# Patient Record
Sex: FEMALE | Race: WHITE | NOT HISPANIC OR LATINO | URBAN - METROPOLITAN AREA
[De-identification: names, ages, dates, MRNs, and addresses within clinical notes are randomized per-mention and may not be internally consistent; named-entity substitution may affect disease eponyms.]

---

## 2017-06-15 ENCOUNTER — OUTPATIENT (OUTPATIENT)
Dept: OUTPATIENT SERVICES | Facility: HOSPITAL | Age: 72
LOS: 1 days | Discharge: HOME | End: 2017-06-15
Payer: MEDICARE

## 2017-06-15 DIAGNOSIS — Z98.891 HISTORY OF UTERINE SCAR FROM PREVIOUS SURGERY: Chronic | ICD-10-CM

## 2017-06-15 DIAGNOSIS — Z98.890 OTHER SPECIFIED POSTPROCEDURAL STATES: Chronic | ICD-10-CM

## 2017-06-15 DIAGNOSIS — Z90.49 ACQUIRED ABSENCE OF OTHER SPECIFIED PARTS OF DIGESTIVE TRACT: Chronic | ICD-10-CM

## 2017-06-15 LAB
BUN SERPL-MCNC: 29 MG/DL — HIGH (ref 7–23)
CALCIUM SERPL-MCNC: 10.1 MG/DL — SIGNIFICANT CHANGE UP (ref 8.4–10.5)
CHLORIDE SERPL-SCNC: 104 MMOL/L — SIGNIFICANT CHANGE UP (ref 98–107)
CO2 SERPL-SCNC: 24 MMOL/L — SIGNIFICANT CHANGE UP (ref 22–31)
CREAT SERPL-MCNC: 0.82 MG/DL — SIGNIFICANT CHANGE UP (ref 0.5–1.3)
GLUCOSE SERPL-MCNC: 104 MG/DL — HIGH (ref 70–99)
HBA1C BLD-MCNC: 5.9 % — HIGH (ref 4–5.6)
HCT VFR BLD CALC: 39.5 % — SIGNIFICANT CHANGE UP (ref 34.5–45)
HGB BLD-MCNC: 12.6 G/DL — SIGNIFICANT CHANGE UP (ref 11.5–15.5)
MCHC RBC-ENTMCNC: 26.5 PG — LOW (ref 27–34)
MCHC RBC-ENTMCNC: 31.9 % — LOW (ref 32–36)
MCV RBC AUTO: 83.2 FL — SIGNIFICANT CHANGE UP (ref 80–100)
PLATELET # BLD AUTO: 245 K/UL — SIGNIFICANT CHANGE UP (ref 150–400)
PMV BLD: 10.3 FL — SIGNIFICANT CHANGE UP (ref 7–13)
POTASSIUM SERPL-MCNC: 4.5 MMOL/L — SIGNIFICANT CHANGE UP (ref 3.5–5.3)
POTASSIUM SERPL-SCNC: 4.5 MMOL/L — SIGNIFICANT CHANGE UP (ref 3.5–5.3)
RBC # BLD: 4.75 M/UL — SIGNIFICANT CHANGE UP (ref 3.8–5.2)
RBC # FLD: 14.5 % — SIGNIFICANT CHANGE UP (ref 10.3–14.5)
SODIUM SERPL-SCNC: 143 MMOL/L — SIGNIFICANT CHANGE UP (ref 135–145)
WBC # BLD: 8.9 K/UL — SIGNIFICANT CHANGE UP (ref 3.8–10.5)
WBC # FLD AUTO: 8.9 K/UL — SIGNIFICANT CHANGE UP (ref 3.8–10.5)

## 2017-06-15 PROCEDURE — 93010 ELECTROCARDIOGRAM REPORT: CPT

## 2017-06-15 RX ORDER — ASPIRIN/CALCIUM CARB/MAGNESIUM 324 MG
81 TABLET ORAL DAILY
Qty: 0 | Refills: 0 | Status: DISCONTINUED | OUTPATIENT
Start: 2017-06-16 | End: 2017-06-30

## 2017-06-15 RX ORDER — METOPROLOL TARTRATE 50 MG
1 TABLET ORAL
Qty: 30 | Refills: 0 | OUTPATIENT
Start: 2017-06-15 | End: 2017-07-15

## 2017-06-15 RX ORDER — SODIUM CHLORIDE 9 MG/ML
3 INJECTION INTRAMUSCULAR; INTRAVENOUS; SUBCUTANEOUS EVERY 8 HOURS
Qty: 0 | Refills: 0 | Status: DISCONTINUED | OUTPATIENT
Start: 2017-06-15 | End: 2017-06-30

## 2017-06-15 RX ORDER — SODIUM CHLORIDE 9 MG/ML
500 INJECTION INTRAMUSCULAR; INTRAVENOUS; SUBCUTANEOUS
Qty: 0 | Refills: 0 | Status: DISCONTINUED | OUTPATIENT
Start: 2017-06-15 | End: 2017-06-30

## 2017-06-15 RX ORDER — ACETAMINOPHEN 500 MG
650 TABLET ORAL EVERY 6 HOURS
Qty: 0 | Refills: 0 | Status: DISCONTINUED | OUTPATIENT
Start: 2017-06-15 | End: 2017-06-30

## 2017-06-15 RX ORDER — ATORVASTATIN CALCIUM 80 MG/1
1 TABLET, FILM COATED ORAL
Qty: 30 | Refills: 0 | OUTPATIENT
Start: 2017-06-15 | End: 2017-07-15

## 2017-06-15 RX ORDER — METOPROLOL TARTRATE 50 MG
25 TABLET ORAL ONCE
Qty: 0 | Refills: 0 | Status: COMPLETED | OUTPATIENT
Start: 2017-06-15 | End: 2017-06-15

## 2017-06-15 RX ORDER — CLOPIDOGREL BISULFATE 75 MG/1
75 TABLET, FILM COATED ORAL DAILY
Qty: 0 | Refills: 0 | Status: DISCONTINUED | OUTPATIENT
Start: 2017-06-16 | End: 2017-06-30

## 2017-06-15 RX ORDER — CLOPIDOGREL BISULFATE 75 MG/1
1 TABLET, FILM COATED ORAL
Qty: 30 | Refills: 0 | OUTPATIENT
Start: 2017-06-15 | End: 2017-07-15

## 2017-06-15 RX ORDER — LABETALOL HCL 100 MG
20 TABLET ORAL ONCE
Qty: 0 | Refills: 0 | Status: COMPLETED | OUTPATIENT
Start: 2017-06-15 | End: 2017-06-15

## 2017-06-15 RX ADMIN — Medication 20 MILLIGRAM(S): at 10:33

## 2017-06-15 RX ADMIN — Medication 650 MILLIGRAM(S): at 10:28

## 2017-06-15 RX ADMIN — SODIUM CHLORIDE 75 MILLILITER(S): 9 INJECTION INTRAMUSCULAR; INTRAVENOUS; SUBCUTANEOUS at 10:26

## 2017-06-15 RX ADMIN — Medication 25 MILLIGRAM(S): at 12:53

## 2017-06-15 RX ADMIN — Medication 650 MILLIGRAM(S): at 11:00

## 2017-06-15 NOTE — H&P CARDIOLOGY - PMH
CVA (cerebral vascular accident)  August 2015 - speech  Epilepsy  speech disturbance for many years  Hemorrhagic stroke  August 2015 - speech deficit worsened and visual field disturbances, gait disturbances  HTN (hypertension)    Hyperlipidemia    Urinary incontinence

## 2017-06-15 NOTE — H&P CARDIOLOGY - HISTORY OF PRESENT ILLNESS
72 year old female with HTN, hyperlipidemia (self d/c'd from side effect), hemorrhagic CVA (2015), epilepsy (last seizure 35years ago) who presented to his Cardiologist complaining of chest pain walking less than block, relieved with rest. This is more exertion than her typical ADL's of housework. Since, she has noted intermittent chest pain with her house. Complains of fatigue at baseline.  Denies dizziness, syncope, edema, orthopnea, PND.   In light of patients cardiac risk factors, symptoms and abnormal noninvasive test findings there is high suspicion for CAD. Patient is now referred to [facility] for a cardiac catheterization with possible PTCA/stent. 72 year old female with HTN, hyperlipidemia (self d/c'd from side effect), hemorrhagic CVA (2015), epilepsy (last seizure 35years ago) who presented to his Cardiologist complaining of chest pain walking less than block, relieved with rest. This is more exertion than her typical ADL's of housework. Since, she has noted intermittent chest pain with her house. Complains of fatigue at baseline.  Denies dizziness, syncope, edema, orthopnea, PND. Denies recent stress test.   In light of patients cardiac risk factors, symptoms and abnormal noninvasive test findings there is high suspicion for CAD. Patient is now referred to [facility] for a cardiac catheterization with possible PTCA/stent.

## 2017-06-15 NOTE — CHART NOTE - NSCHARTNOTEFT_GEN_A_CORE
The patient was noted to have elevated HgbA1c. The patient was made aware. Prediabetes pamphlet is given. The patient was recommended to f/u with PMD for further treatment.     also medications reviewed in detail with patient and  and family, med list reviewed with outpatient [facility] to obtain home medicine list as patient did not have list or know her meds. It is unclear if patient is taking Metoprolol as no record at [facility] and patient admits to getting it 2years ago via [facility] which wouldn't make sense if taking now.   Patient advised to accept assistance from her daughter and  in organizing her home medications.   For now needs better BP control, advised to stop this "metoprolol" that she thinks shes taking at home and bring all her bottles with her to Dr. Francis next week. Advised to continue Benicar 20mg po daily and start Toprol XL 25mg po daily as patients BP responded well to labetalol IVP. Will also start a different statin, Lipitor 20mg po bedtime given CAD findings, admits to not tolerating crestor in the past due to "I didn't feel well".   will discuss with Dr BRIGETTE Stern   Patient advised to follow up with Dr Francis next week.

## 2017-06-18 ENCOUNTER — EMERGENCY (EMERGENCY)
Facility: HOSPITAL | Age: 72
LOS: 1 days | Discharge: HOME | End: 2017-06-18
Attending: EMERGENCY MEDICINE | Admitting: EMERGENCY MEDICINE
Payer: MEDICARE

## 2017-06-18 VITALS
RESPIRATION RATE: 18 BRPM | TEMPERATURE: 98 F | OXYGEN SATURATION: 97 % | DIASTOLIC BLOOD PRESSURE: 115 MMHG | HEART RATE: 72 BPM | SYSTOLIC BLOOD PRESSURE: 228 MMHG

## 2017-06-18 VITALS
OXYGEN SATURATION: 98 % | RESPIRATION RATE: 16 BRPM | SYSTOLIC BLOOD PRESSURE: 206 MMHG | DIASTOLIC BLOOD PRESSURE: 69 MMHG | HEART RATE: 60 BPM

## 2017-06-18 DIAGNOSIS — Z98.891 HISTORY OF UTERINE SCAR FROM PREVIOUS SURGERY: Chronic | ICD-10-CM

## 2017-06-18 DIAGNOSIS — Z98.890 OTHER SPECIFIED POSTPROCEDURAL STATES: Chronic | ICD-10-CM

## 2017-06-18 DIAGNOSIS — Z90.49 ACQUIRED ABSENCE OF OTHER SPECIFIED PARTS OF DIGESTIVE TRACT: Chronic | ICD-10-CM

## 2017-06-18 PROCEDURE — 99050 MEDICAL SERVICES AFTER HRS: CPT

## 2017-06-18 PROCEDURE — 99284 EMERGENCY DEPT VISIT MOD MDM: CPT | Mod: 25

## 2017-06-18 PROCEDURE — 93010 ELECTROCARDIOGRAM REPORT: CPT

## 2017-06-18 RX ORDER — ACETAMINOPHEN 500 MG
650 TABLET ORAL ONCE
Qty: 0 | Refills: 0 | Status: COMPLETED | OUTPATIENT
Start: 2017-06-18 | End: 2017-06-18

## 2017-06-18 RX ADMIN — Medication 650 MILLIGRAM(S): at 03:13

## 2017-06-18 RX ADMIN — Medication 0.1 MILLIGRAM(S): at 03:13

## 2017-06-18 NOTE — ED ADULT TRIAGE NOTE - CHIEF COMPLAINT QUOTE
Pt. c/o high blood pressure (224/110) at home x 2 days; had angiogram with one stent placed on 6/15/17. Per daughter patient's pressure have remained elevated despite taking BP medications as prescribed. Pt. admits to mild headache, denies dizziness, lightheadedness, SOB, chest pain, weakness or neuro deficits. Hx of CVA, unsteady gait at baseline per daughter. Pt. c/o high blood pressure (224/110) at home x 2 days; had angiogram with one stent placed on 6/15/17. Per daughter patient's pressures have remained elevated since yesterday despite taking BP medications as prescribed. Pt. admits to mild headache, denies dizziness, lightheadedness, SOB, chest pain, weakness or neuro deficits. Hx of CVA, unsteady gait at baseline per daughter.

## 2017-06-18 NOTE — ED ADULT NURSE NOTE - CHIEF COMPLAINT QUOTE
Pt. c/o high blood pressure (224/110) at home x 2 days; had angiogram with one stent placed on 6/15/17. Per daughter patient's pressures have remained elevated since yesterday despite taking BP medications as prescribed. Pt. admits to mild headache, denies dizziness, lightheadedness, SOB, chest pain, weakness or neuro deficits. Hx of CVA, unsteady gait at baseline per daughter.

## 2017-06-18 NOTE — ED PROVIDER NOTE - CARE PLAN
Principal Discharge DX:	HTN (hypertension) Principal Discharge DX:	HTN (hypertension)  Instructions for follow-up, activity and diet:	1) Please follow-up with your primary care doctor in the next 5-7 days.  Please call tomorrow for an appointment.  If you cannot follow-up with your primary care doctor please return to the ED for any urgent issues.  2) You were given a copy of the tests performed today.  Please bring the results with you and review them with your primary care doctor.  3) If you have any worsening of symptoms or any other concerns please return to the ED immediately.  4) Please continue taking your home medications as directed.  5) Start taking Clonidine 0.1mg three times a day. Principal Discharge DX:	HTN (hypertension)  Instructions for follow-up, activity and diet:	1) Please follow-up with your primary care doctor in the next 5-7 days.  Please call tomorrow for an appointment.  If you cannot follow-up with your primary care doctor please return to the ED for any urgent issues.  2) You were given a copy of the tests performed today.  Please bring the results with you and review them with your primary care doctor.  3) If you have any worsening of symptoms or any other concerns please return to the ED immediately.  4) Please continue taking your home medications as directed.  5) Start taking Clonidine 0.1mg two times a day.

## 2017-06-18 NOTE — ED PROVIDER NOTE - MEDICAL DECISION MAKING DETAILS
72yoF hx of htn, CAD recent stent placed, pw uncontrolled htn after discontinuing clonidine TID. bp 240s in ED. will give tylenol for headache and restart Clonidine.

## 2017-06-18 NOTE — ED PROVIDER NOTE - PROGRESS NOTE DETAILS
PT seen and reassessed.  Patient symptomatically improved.   AAOX3, NAD, Vital signs improved. BP decreased.  Patient able to ambulate w/o difficulty, is tolerating PO intake. Pt will call Dr. John and make appointment Monday morning. Bennie: Left message w/ Dr. Stern answering service at 0313 w/o call back. Pt now asymptomatic. Ambulating like at baseline. BP improving. Will dc w/ clonidine bid (pt pharmacy not open sunday so given written script). Close PMD f/u and strict return instructions.

## 2017-06-18 NOTE — ED ADULT NURSE NOTE - OBJECTIVE STATEMENT
Pt 72y presents to ED with high blood pressure. As per daughter pt blood pressure has been high since yesterday (currently 246/91). As per daughter pt had stent placed last thursday and switched medications, since then pressure has been elevated despite taking blood pressure meds. Pt has hx of HTN, HLD, CVA. Pt currently denies N/V/D, chest pain, SOB, HA, dizziness, lightheadedness. Pt appears comfortable at this time, with no complaint besides high blood pressure. Will continue to monitor.

## 2017-06-18 NOTE — ED ADULT NURSE NOTE - CHPI ED SYMPTOMS NEG
no fever/no tingling/no vomiting/no numbness/no nausea/no weakness/no chills/no dizziness/no decreased eating/drinking/no pain

## 2017-06-18 NOTE — ED PROVIDER NOTE - PLAN OF CARE
1) Please follow-up with your primary care doctor in the next 5-7 days.  Please call tomorrow for an appointment.  If you cannot follow-up with your primary care doctor please return to the ED for any urgent issues.  2) You were given a copy of the tests performed today.  Please bring the results with you and review them with your primary care doctor.  3) If you have any worsening of symptoms or any other concerns please return to the ED immediately.  4) Please continue taking your home medications as directed.  5) Start taking Clonidine 0.1mg three times a day. 1) Please follow-up with your primary care doctor in the next 5-7 days.  Please call tomorrow for an appointment.  If you cannot follow-up with your primary care doctor please return to the ED for any urgent issues.  2) You were given a copy of the tests performed today.  Please bring the results with you and review them with your primary care doctor.  3) If you have any worsening of symptoms or any other concerns please return to the ED immediately.  4) Please continue taking your home medications as directed.  5) Start taking Clonidine 0.1mg two times a day.

## 2017-06-18 NOTE — ED PROVIDER NOTE - ATTENDING CONTRIBUTION TO CARE
72F PMH HTN,, hemorrhagic CVA (residual speech and balance deficits), CAD w/ stent placed 6/15, p/w high BP since discharge from the outpt procedure. Clonidine stopped. Usual BPs 175. C/o minimal HA similar to prior otherwise asymptomatic. Hypertensive (reaching systolics 240s), other vitals wnl, exam at baseline.  HA: Likely benign. Clinically not related to BP. Tylenol, reassess.  BP: Essentially asymptomatic. Likely 2/2 stopping home med. Will restart home clonidine and reassess.

## 2017-06-18 NOTE — ED PROVIDER NOTE - OBJECTIVE STATEMENT
72yoF hx of HTN, CAD, CVA with residual deficits, recent angiocath with 1 stent placed, pw high blood pressure since discharge from hospital. pt stopped taking her clonidine TID and has since had BP with systolic >200s. endorses mild headache starting this AM. Denies chest pain, sob, abd pain, nausea, vomiting, diarrhea, weakness, numbness, new slurred speech or any other complaints. pt taking plavix. 72yoF hx of HTN, CAD, CVA with residual deficits, recent angiocath with 1 stent placed, pw high blood pressure since discharge from hospital. pt stopped taking her clonidine TID and has since had BP with systolic >200s. endorses mild headache starting this AM. Denies chest pain, sob, abd pain, nausea, vomiting, diarrhea, weakness, numbness, new slurred speech or any other complaints. pt taking plavix.  Klepfish: 72F PMH HTN,, hemorrhagic CVA (residual speech and balance deficits), CAD w/ stent placed 6/15, p/w high BP. States usual , but since discharge has been 200s. Came today bec daughter finally convinced her. Was taking clonidine tid but stopped since the cath. C/o minimal generalized HA, gradual onset while at a party, similar and less severe than multiple prior. No vision changes, new weakness/numbness, CP/SOB, lightheaded, diaphoresis, abd pain, urinary complaints.

## 2018-08-01 PROBLEM — I61.9 NONTRAUMATIC INTRACEREBRAL HEMORRHAGE, UNSPECIFIED: Chronic | Status: ACTIVE | Noted: 2017-06-15

## 2018-08-01 PROBLEM — E78.5 HYPERLIPIDEMIA, UNSPECIFIED: Chronic | Status: ACTIVE | Noted: 2017-06-15

## 2018-08-01 PROBLEM — I10 ESSENTIAL (PRIMARY) HYPERTENSION: Chronic | Status: ACTIVE | Noted: 2017-06-15

## 2018-08-01 PROBLEM — I63.9 CEREBRAL INFARCTION, UNSPECIFIED: Chronic | Status: ACTIVE | Noted: 2017-06-15

## 2018-08-01 PROBLEM — R32 UNSPECIFIED URINARY INCONTINENCE: Chronic | Status: ACTIVE | Noted: 2017-06-15

## 2018-08-01 PROBLEM — G40.909 EPILEPSY, UNSPECIFIED, NOT INTRACTABLE, WITHOUT STATUS EPILEPTICUS: Chronic | Status: ACTIVE | Noted: 2017-06-15

## 2018-08-02 ENCOUNTER — OUTPATIENT (OUTPATIENT)
Dept: OUTPATIENT SERVICES | Facility: HOSPITAL | Age: 73
LOS: 1 days | End: 2018-08-02
Payer: MEDICARE

## 2018-08-02 VITALS
TEMPERATURE: 98 F | SYSTOLIC BLOOD PRESSURE: 130 MMHG | RESPIRATION RATE: 16 BRPM | HEIGHT: 56 IN | DIASTOLIC BLOOD PRESSURE: 60 MMHG | HEART RATE: 62 BPM | WEIGHT: 141.98 LBS

## 2018-08-02 DIAGNOSIS — Z98.891 HISTORY OF UTERINE SCAR FROM PREVIOUS SURGERY: Chronic | ICD-10-CM

## 2018-08-02 DIAGNOSIS — Z98.890 OTHER SPECIFIED POSTPROCEDURAL STATES: Chronic | ICD-10-CM

## 2018-08-02 DIAGNOSIS — C50.419 MALIGNANT NEOPLASM OF UPPER-OUTER QUADRANT OF UNSPECIFIED FEMALE BREAST: ICD-10-CM

## 2018-08-02 DIAGNOSIS — Z95.5 PRESENCE OF CORONARY ANGIOPLASTY IMPLANT AND GRAFT: ICD-10-CM

## 2018-08-02 DIAGNOSIS — Z95.5 PRESENCE OF CORONARY ANGIOPLASTY IMPLANT AND GRAFT: Chronic | ICD-10-CM

## 2018-08-02 DIAGNOSIS — Z90.49 ACQUIRED ABSENCE OF OTHER SPECIFIED PARTS OF DIGESTIVE TRACT: Chronic | ICD-10-CM

## 2018-08-02 LAB
BUN SERPL-MCNC: 16 MG/DL — SIGNIFICANT CHANGE UP (ref 7–23)
CALCIUM SERPL-MCNC: 9.1 MG/DL — SIGNIFICANT CHANGE UP (ref 8.4–10.5)
CHLORIDE SERPL-SCNC: 102 MMOL/L — SIGNIFICANT CHANGE UP (ref 98–107)
CO2 SERPL-SCNC: 26 MMOL/L — SIGNIFICANT CHANGE UP (ref 22–31)
CREAT SERPL-MCNC: 0.73 MG/DL — SIGNIFICANT CHANGE UP (ref 0.5–1.3)
GLUCOSE SERPL-MCNC: 101 MG/DL — HIGH (ref 70–99)
HCT VFR BLD CALC: 37.3 % — SIGNIFICANT CHANGE UP (ref 34.5–45)
HGB BLD-MCNC: 11.6 G/DL — SIGNIFICANT CHANGE UP (ref 11.5–15.5)
MCHC RBC-ENTMCNC: 26.1 PG — LOW (ref 27–34)
MCHC RBC-ENTMCNC: 31.1 % — LOW (ref 32–36)
MCV RBC AUTO: 83.8 FL — SIGNIFICANT CHANGE UP (ref 80–100)
NRBC # FLD: 0 — SIGNIFICANT CHANGE UP
PLATELET # BLD AUTO: 255 K/UL — SIGNIFICANT CHANGE UP (ref 150–400)
PMV BLD: 10.4 FL — SIGNIFICANT CHANGE UP (ref 7–13)
POTASSIUM SERPL-MCNC: 4.3 MMOL/L — SIGNIFICANT CHANGE UP (ref 3.5–5.3)
POTASSIUM SERPL-SCNC: 4.3 MMOL/L — SIGNIFICANT CHANGE UP (ref 3.5–5.3)
RBC # BLD: 4.45 M/UL — SIGNIFICANT CHANGE UP (ref 3.8–5.2)
RBC # FLD: 13.8 % — SIGNIFICANT CHANGE UP (ref 10.3–14.5)
SODIUM SERPL-SCNC: 140 MMOL/L — SIGNIFICANT CHANGE UP (ref 135–145)
WBC # BLD: 10.62 K/UL — HIGH (ref 3.8–10.5)
WBC # FLD AUTO: 10.62 K/UL — HIGH (ref 3.8–10.5)

## 2018-08-02 PROCEDURE — 93010 ELECTROCARDIOGRAM REPORT: CPT

## 2018-08-02 RX ORDER — SODIUM CHLORIDE 9 MG/ML
1000 INJECTION, SOLUTION INTRAVENOUS
Qty: 0 | Refills: 0 | Status: DISCONTINUED | OUTPATIENT
Start: 2018-08-10 | End: 2018-08-25

## 2018-08-02 RX ORDER — SODIUM CHLORIDE 9 MG/ML
3 INJECTION INTRAMUSCULAR; INTRAVENOUS; SUBCUTANEOUS EVERY 8 HOURS
Qty: 0 | Refills: 0 | Status: DISCONTINUED | OUTPATIENT
Start: 2018-08-10 | End: 2018-08-25

## 2018-08-02 NOTE — H&P PST ADULT - MUSCULOSKELETAL
details… No joint pain, swelling or deformity; no limitation of movement detailed exam no joint erythema/no joint swelling/no joint warmth/no calf tenderness

## 2018-08-02 NOTE — H&P PST ADULT - NSANTHOSAYNRD_GEN_A_CORE
No. FADIA screening performed.  STOP BANG Legend: 0-2 = LOW Risk; 3-4 = INTERMEDIATE Risk; 5-8 = HIGH Risk

## 2018-08-02 NOTE — H&P PST ADULT - NEGATIVE BREAST SYMPTOMS
no nipple discharge R/no breast tenderness L/no nipple discharge L no breast tenderness R/no nipple discharge L/no breast tenderness L/no nipple discharge R

## 2018-08-02 NOTE — H&P PST ADULT - NEGATIVE CARDIOVASCULAR SYMPTOMS
no palpitations/no orthopnea/no chest pain/no dyspnea on exertion/no peripheral edema/no paroxysmal nocturnal dyspnea/no claudication

## 2018-08-02 NOTE — H&P PST ADULT - NEGATIVE MUSCULOSKELETAL SYMPTOMS
no arthritis/no leg pain L/no arthralgia/no joint swelling no arthritis/no joint swelling/no arthralgia/no neck pain/no leg pain L

## 2018-08-02 NOTE — H&P PST ADULT - PMH
CAD (coronary artery disease)    CVA (cerebral vascular accident)  August 2015 - speech  Epilepsy  speech disturbance for many years  Hemorrhagic stroke  August 2015 - speech deficit worsened and visual field disturbances, gait disturbances  HTN (hypertension)    Hyperlipidemia    Urinary incontinence CAD (coronary artery disease)    CVA (cerebral vascular accident)  August 2015 - speech  Epilepsy  speech disturbance for many years  Hemorrhagic stroke  August 2015 - speech deficit worsened and visual field disturbances, gait disturbances  HTN (hypertension)    Hyperlipidemia    Stented coronary artery  06/2017  Urinary incontinence

## 2018-08-02 NOTE — H&P PST ADULT - PROBLEM SELECTOR PLAN 1
Scheduled for right Cathleen  lumpectomy sentinel lymph node biopsy on 08/10/18. Pre op instructions, famotidine, chlorhexidine gluconate soap given and explained. Pt verbalized understanding.

## 2018-08-02 NOTE — H&P PST ADULT - NEGATIVE RESPIRATORY AND THORAX SYMPTOMS
no cough/no pleuritic chest pain/no wheezing/no dyspnea no dyspnea/no hemoptysis/no pleuritic chest pain/no wheezing/no cough

## 2018-08-02 NOTE — H&P PST ADULT - PSH
H/O:  section    History of appendectomy    History of umbilical hernia repair    Stented coronary artery

## 2018-08-02 NOTE — H&P PST ADULT - NEGATIVE GENERAL GENITOURINARY SYMPTOMS
normal urinary frequency/no nocturia/no flank pain R/no bladder infections/no flank pain L/no urine discoloration/no renal colic/normal libido/no gas in urine/no hematuria

## 2018-08-02 NOTE — H&P PST ADULT - PROBLEM SELECTOR PLAN 2
Pt with stented coronary artery, Pt instructed to continue Aspirin prior to surgery. Cardiologist Dr. Hernandez to instruct pt on Plavix prior to surgery  Pending cardiology evaluation

## 2018-08-02 NOTE — H&P PST ADULT - NEGATIVE ENMT SYMPTOMS
no sinus symptoms/no nasal obstruction/no post-nasal discharge/no vertigo/no nasal congestion/no ear pain/no tinnitus/no hearing difficulty/no nose bleeds/no nasal discharge/no throat pain

## 2018-08-02 NOTE — H&P PST ADULT - NEGATIVE GENERAL SYMPTOMS
no weight gain/no polyphagia/no polydipsia/no chills/no sweating/no anorexia/no weight loss/no fever no weight gain/no malaise/no weight loss/no polydipsia/no chills/no sweating/no anorexia/no polyphagia/no polyuria/no fever

## 2018-08-02 NOTE — H&P PST ADULT - NEGATIVE GASTROINTESTINAL SYMPTOMS
no abdominal pain/no vomiting/no hiccoughs/no steatorrhea/no jaundice/no nausea no steatorrhea/no hiccoughs/no vomiting/no nausea/no abdominal pain/no diarrhea/no melena/no jaundice

## 2018-08-02 NOTE — H&P PST ADULT - HISTORY OF PRESENT ILLNESS
73 year old  female with HTN, hyperlipidemia (self d/c'd from side effect), hemorrhagic CVA (2015), epilepsy (last seizure 35years ago) 73 year old  female with HTN, hyperlipidemia, hemorrhagic CVA (2015), OAB, epilepsy (last seizure 35years ago)   routine mammo 07/2018 showed right breast lump. S/P US/ biopsy of right breast. Pre op diagnosis 73 year old  female with HTN, CAD S/P drug eluting stent (06/15/17), Hyperlipidemia, hemorrhagic CVA (2015), OAB, epilepsy (last seizure 35years ago)   routine mammo 07/2018 showed right breast lump. S/P US/ biopsy of right breast. Pre op diagnosis: Malignant neoplasm of upper - outer quadrant of breast. Now scheduled for right Cathleen  lumpectomy sentinel lymph node biopsy on 08/10/18 73 year old  female with HTN, CAD S/P drug eluting stent (06/15/17), Hyperlipidemia, hemorrhagic CVA (2015), OAB, epilepsy (last seizure 35 years ago)   routine mammo 07/2018 showed right breast lump. S/P US/ biopsy of right breast. Pre op diagnosis: Malignant neoplasm of upper - outer quadrant of breast. Now scheduled for right Cathleen  lumpectomy sentinel lymph node biopsy on 08/10/18

## 2018-08-02 NOTE — H&P PST ADULT - RS GEN PE MLT RESP DETAILS PC
no rhonchi/no subcutaneous emphysema/no chest wall tenderness/clear to auscultation bilaterally/no rales/respirations non-labored/good air movement/no intercostal retractions/airway patent/breath sounds equal

## 2018-08-02 NOTE — H&P PST ADULT - NEGATIVE FEMALE-SPECIFIC SYMPTOMS
no dysmenorrhea/not applicable/no irregular menses/no vaginal discharge/no menorrhagia/no spotting/no pelvic pain

## 2018-08-02 NOTE — H&P PST ADULT - NEGATIVE OPHTHALMOLOGIC SYMPTOMS
no lacrimation L/no lacrimation R/no blurred vision R/no irritation R/no irritation L/no loss of vision R/no pain R/no loss of vision L/no diplopia/no photophobia no diplopia/no photophobia/no blurred vision R/no discharge R/no irritation L/no loss of vision R/no discharge L/no pain R/no irritation R/no loss of vision L/no lacrimation L/no lacrimation R

## 2018-08-06 ENCOUNTER — RESULT REVIEW (OUTPATIENT)
Age: 73
End: 2018-08-06

## 2018-08-08 ENCOUNTER — OUTPATIENT (OUTPATIENT)
Dept: OUTPATIENT SERVICES | Facility: HOSPITAL | Age: 73
LOS: 1 days | End: 2018-08-08
Payer: MEDICARE

## 2018-08-08 ENCOUNTER — APPOINTMENT (OUTPATIENT)
Dept: ULTRASOUND IMAGING | Facility: IMAGING CENTER | Age: 73
End: 2018-08-08
Payer: MEDICARE

## 2018-08-08 DIAGNOSIS — R92.8 OTHER ABNORMAL AND INCONCLUSIVE FINDINGS ON DIAGNOSTIC IMAGING OF BREAST: ICD-10-CM

## 2018-08-08 DIAGNOSIS — Z98.891 HISTORY OF UTERINE SCAR FROM PREVIOUS SURGERY: Chronic | ICD-10-CM

## 2018-08-08 DIAGNOSIS — Z98.890 OTHER SPECIFIED POSTPROCEDURAL STATES: Chronic | ICD-10-CM

## 2018-08-08 DIAGNOSIS — Z95.5 PRESENCE OF CORONARY ANGIOPLASTY IMPLANT AND GRAFT: Chronic | ICD-10-CM

## 2018-08-08 DIAGNOSIS — Z90.49 ACQUIRED ABSENCE OF OTHER SPECIFIED PARTS OF DIGESTIVE TRACT: Chronic | ICD-10-CM

## 2018-08-08 PROBLEM — I25.10 ATHEROSCLEROTIC HEART DISEASE OF NATIVE CORONARY ARTERY WITHOUT ANGINA PECTORIS: Chronic | Status: ACTIVE | Noted: 2018-08-02

## 2018-08-08 PROCEDURE — 19285 PERQ DEV BREAST 1ST US IMAG: CPT | Mod: RT

## 2018-08-08 PROCEDURE — C1739: CPT

## 2018-08-08 PROCEDURE — 19285 PERQ DEV BREAST 1ST US IMAG: CPT

## 2018-08-09 ENCOUNTER — TRANSCRIPTION ENCOUNTER (OUTPATIENT)
Age: 73
End: 2018-08-09

## 2018-08-09 NOTE — ASU PATIENT PROFILE, ADULT - PMH
CAD (coronary artery disease)    CVA (cerebral vascular accident)  August 2015 - speech  Epilepsy  speech disturbance for many years  Hemorrhagic stroke  August 2015 - speech deficit worsened and visual field disturbances, gait disturbances  HTN (hypertension)    Hyperlipidemia    Stented coronary artery  06/2017  Urinary incontinence

## 2018-08-10 ENCOUNTER — OUTPATIENT (OUTPATIENT)
Dept: OUTPATIENT SERVICES | Facility: HOSPITAL | Age: 73
LOS: 1 days | Discharge: HOME | End: 2018-08-10
Payer: MEDICARE

## 2018-08-10 ENCOUNTER — APPOINTMENT (OUTPATIENT)
Dept: NUCLEAR MEDICINE | Facility: HOSPITAL | Age: 73
End: 2018-08-10

## 2018-08-10 ENCOUNTER — RESULT REVIEW (OUTPATIENT)
Age: 73
End: 2018-08-10

## 2018-08-10 VITALS
HEIGHT: 56 IN | OXYGEN SATURATION: 97 % | RESPIRATION RATE: 17 BRPM | WEIGHT: 141.98 LBS | HEART RATE: 60 BPM | DIASTOLIC BLOOD PRESSURE: 61 MMHG | TEMPERATURE: 98 F | SYSTOLIC BLOOD PRESSURE: 150 MMHG

## 2018-08-10 VITALS
HEART RATE: 69 BPM | DIASTOLIC BLOOD PRESSURE: 59 MMHG | OXYGEN SATURATION: 100 % | RESPIRATION RATE: 16 BRPM | SYSTOLIC BLOOD PRESSURE: 148 MMHG

## 2018-08-10 DIAGNOSIS — Z98.891 HISTORY OF UTERINE SCAR FROM PREVIOUS SURGERY: Chronic | ICD-10-CM

## 2018-08-10 DIAGNOSIS — Z90.49 ACQUIRED ABSENCE OF OTHER SPECIFIED PARTS OF DIGESTIVE TRACT: Chronic | ICD-10-CM

## 2018-08-10 DIAGNOSIS — Z95.5 PRESENCE OF CORONARY ANGIOPLASTY IMPLANT AND GRAFT: Chronic | ICD-10-CM

## 2018-08-10 DIAGNOSIS — C50.419 MALIGNANT NEOPLASM OF UPPER-OUTER QUADRANT OF UNSPECIFIED FEMALE BREAST: ICD-10-CM

## 2018-08-10 DIAGNOSIS — Z98.890 OTHER SPECIFIED POSTPROCEDURAL STATES: Chronic | ICD-10-CM

## 2018-08-10 PROCEDURE — 76098 X-RAY EXAM SURGICAL SPECIMEN: CPT | Mod: 26

## 2018-08-10 PROCEDURE — 88332 PATH CONSLTJ SURG EA ADD BLK: CPT | Mod: 26

## 2018-08-10 PROCEDURE — 88307 TISSUE EXAM BY PATHOLOGIST: CPT | Mod: 26

## 2018-08-10 PROCEDURE — 88342 IMHCHEM/IMCYTCHM 1ST ANTB: CPT | Mod: 26

## 2018-08-10 PROCEDURE — 88331 PATH CONSLTJ SURG 1 BLK 1SPC: CPT | Mod: 26

## 2018-08-10 PROCEDURE — 88305 TISSUE EXAM BY PATHOLOGIST: CPT | Mod: 26

## 2018-08-10 PROCEDURE — 88341 IMHCHEM/IMCYTCHM EA ADD ANTB: CPT | Mod: 26

## 2018-08-10 RX ORDER — OLMESARTAN MEDOXOMIL 5 MG/1
1 TABLET, FILM COATED ORAL
Qty: 0 | Refills: 0 | COMMUNITY

## 2018-08-10 RX ORDER — TOLTERODINE TARTRATE 1 MG/1
1 TABLET, FILM COATED ORAL
Qty: 0 | Refills: 0 | COMMUNITY

## 2018-08-10 RX ORDER — ONDANSETRON 8 MG/1
4 TABLET, FILM COATED ORAL ONCE
Qty: 0 | Refills: 0 | Status: DISCONTINUED | OUTPATIENT
Start: 2018-08-10 | End: 2018-08-25

## 2018-08-10 RX ORDER — ACETAMINOPHEN WITH CODEINE 300MG-30MG
1 TABLET ORAL
Qty: 20 | Refills: 0 | OUTPATIENT
Start: 2018-08-10

## 2018-08-10 RX ORDER — FENTANYL CITRATE 50 UG/ML
25 INJECTION INTRAVENOUS
Qty: 0 | Refills: 0 | Status: DISCONTINUED | OUTPATIENT
Start: 2018-08-10 | End: 2018-08-10

## 2018-08-10 RX ORDER — GABAPENTIN 400 MG/1
1 CAPSULE ORAL
Qty: 0 | Refills: 0 | COMMUNITY

## 2018-08-10 NOTE — BRIEF OPERATIVE NOTE - OPERATION/FINDINGS
R. axillary sentinel lymph node biopsy. Right lumpectomy with reflector guidance.     Incisions closed 4-0 Monocryl and gauze dressings.

## 2018-08-10 NOTE — ASU DISCHARGE PLAN (ADULT/PEDIATRIC). - COMMENTS
Please call Dr. Sanderson's office at [phone] to schedule an appointment for the end of August.

## 2018-08-10 NOTE — ASU DISCHARGE PLAN (ADULT/PEDIATRIC). - DRESSING FT
You may remove the bulky dressings the day following your surgery. Please leave the steri-strips on.

## 2018-08-10 NOTE — BRIEF OPERATIVE NOTE - PROCEDURE
<<-----Click on this checkbox to enter Procedure Lumpectomy of breast with sentinel node biopsy  08/10/2018    Active  [id number]

## 2018-08-10 NOTE — ASU DISCHARGE PLAN (ADULT/PEDIATRIC). - SPECIAL INSTRUCTIONS
Please call Dr. Sanderson's office at [phone] to schedule a follow up appointment for the end of August.     For severe pain, you have been prescribed Tylenol with Codeine. Please take this medicine as prescribed and no more than every 6 hours. Do not drive while taking this medication.    You may resume your Aspirin and Plavix the day following your surgery.     You may shower the day following your surgery, and may remove the bulky dressing at this time. Please keep the steristrips on, they will fall off on their own.  You have been provided a surgical bra, which is recommended that you wear.     If you notice that you have severe or worsening pain, nausea and vomiting, brisk bleeding through several new bandages, a warm or red breast with or without pus, or if you have a fever of greater than 101.0F please call Dr. Sanderson's office at [phone] or return to the ED.

## 2018-08-10 NOTE — ASU DISCHARGE PLAN (ADULT/PEDIATRIC). - MEDICATION SUMMARY - MEDICATIONS TO TAKE
I will START or STAY ON the medications listed below when I get home from the hospital:    acetaminophen-codeine 300 mg-15 mg oral tablet  -- 1 tab(s) by mouth every 6 hours, As Needed -for severe pain MDD:4 tabs   -- Caution federal law prohibits the transfer of this drug to any person other  than the person for whom it was prescribed.  May cause drowsiness.  Alcohol may intensify this effect.  Use care when operating dangerous machinery.  This product contains acetaminophen.  Do not use  with any other product containing acetaminophen to prevent possible liver damage.  Using more of this medication than prescribed may cause serious breathing problems.    -- Indication: For Severe pain    Benicar 20 mg oral tablet  -- 1 tab(s) by mouth once a day  -- Indication: For Home medication    cloNIDine 0.1 mg oral tablet  -- 1 tab(s) by mouth 4 times a day  -- Indication: For Home medication    gabapentin 300 mg oral capsule  -- 1 cap(s) by mouth 2 times a day  -- Indication: For Home medication    Lipitor 20 mg oral tablet  -- 1 tab(s) by mouth once a day  -- Avoid grapefruit and grapefruit juice while taking this medication.  Do not take this drug if you are pregnant.  It is very important that you take or use this exactly as directed.  Do not skip doses or discontinue unless directed by your doctor.  Obtain medical advice before taking any non-prescription drugs as some may affect the action of this medication.  Take with food or milk.    -- Indication: For Home medication    Plavix 75 mg oral tablet  -- 1 tab(s) by mouth once a day  -- Do not take aspirin or aspirin containing products without knowledge and consent of your physician.    -- Indication: For Home medication    metoprolol 25 mg oral tablet, extended release  -- 1 tab(s) by mouth once a day  -- It is very important that you take or use this exactly as directed.  Do not skip doses or discontinue unless directed by your doctor.  May cause drowsiness.  Alcohol may intensify this effect.  Use care when operating dangerous machinery.  Some non-prescription drugs may aggravate your condition.  Read all labels carefully.  If a warning appears, check with your doctor before taking.  Swallow whole.  Do not crush.  Take with food or milk.  This drug may impair the ability to drive or operate machinery.  Use care until you become familiar with its effects.    -- Indication: For Home medication    Detrol LA 4 mg oral capsule, extended release  -- 1 cap(s) by mouth once a day  -- Indication: For Home medication

## 2018-08-10 NOTE — ASU PREOP CHECKLIST - COMMENTS
plavix, clonodine and toprol with sip of water at 0500 Plavix, clonidine and Toprol with sip of water at 0500

## 2018-08-15 LAB — SURGICAL PATHOLOGY STUDY: SIGNIFICANT CHANGE UP

## 2018-09-21 ENCOUNTER — RESULT REVIEW (OUTPATIENT)
Age: 73
End: 2018-09-21

## 2018-09-21 ENCOUNTER — OUTPATIENT (OUTPATIENT)
Dept: OUTPATIENT SERVICES | Facility: HOSPITAL | Age: 73
LOS: 1 days | End: 2018-09-21
Payer: MEDICARE

## 2018-09-21 DIAGNOSIS — Z90.49 ACQUIRED ABSENCE OF OTHER SPECIFIED PARTS OF DIGESTIVE TRACT: Chronic | ICD-10-CM

## 2018-09-21 DIAGNOSIS — Z98.891 HISTORY OF UTERINE SCAR FROM PREVIOUS SURGERY: Chronic | ICD-10-CM

## 2018-09-21 DIAGNOSIS — Z98.890 OTHER SPECIFIED POSTPROCEDURAL STATES: Chronic | ICD-10-CM

## 2018-09-21 DIAGNOSIS — Z95.5 PRESENCE OF CORONARY ANGIOPLASTY IMPLANT AND GRAFT: Chronic | ICD-10-CM

## 2018-09-21 PROCEDURE — 88363 XM ARCHIVE TISSUE MOLEC ANAL: CPT

## 2018-10-03 LAB — SURGICAL PATHOLOGY STUDY: SIGNIFICANT CHANGE UP

## 2018-10-09 DIAGNOSIS — R69 ILLNESS, UNSPECIFIED: ICD-10-CM

## 2021-02-15 NOTE — H&P PST ADULT - VENOUS THROMBOEMBOLISM AGE
LOV 9/12/2019  Pending 2/24/2020    Labs 2/27/2020    Meets clinic protocols medication was refilled         
60-75 yrs

## 2022-03-10 NOTE — ED ADULT TRIAGE NOTE - PAIN RATING/NUMBER SCALE (0-10): ACTIVITY
3 Benzoyl Peroxide Pregnancy And Lactation Text: This medication is Pregnancy Category C. It is unknown if benzoyl peroxide is excreted in breast milk.

## 2022-04-28 ENCOUNTER — APPOINTMENT (OUTPATIENT)
Dept: VASCULAR SURGERY | Facility: CLINIC | Age: 77
End: 2022-04-28
Payer: MEDICARE

## 2022-04-28 VITALS
WEIGHT: 145 LBS | BODY MASS INDEX: 29.23 KG/M2 | SYSTOLIC BLOOD PRESSURE: 117 MMHG | HEIGHT: 59 IN | DIASTOLIC BLOOD PRESSURE: 68 MMHG | TEMPERATURE: 95.7 F | HEART RATE: 59 BPM

## 2022-04-28 DIAGNOSIS — I63.9 CEREBRAL INFARCTION, UNSPECIFIED: ICD-10-CM

## 2022-04-28 PROCEDURE — 99204 OFFICE O/P NEW MOD 45 MIN: CPT

## 2022-04-28 PROCEDURE — 93880 EXTRACRANIAL BILAT STUDY: CPT

## 2022-10-31 ENCOUNTER — APPOINTMENT (OUTPATIENT)
Dept: VASCULAR SURGERY | Facility: CLINIC | Age: 77
End: 2022-10-31

## 2022-11-02 ENCOUNTER — APPOINTMENT (OUTPATIENT)
Dept: VASCULAR SURGERY | Facility: CLINIC | Age: 77
End: 2022-11-02

## 2024-07-31 ENCOUNTER — APPOINTMENT (OUTPATIENT)
Dept: PULMONOLOGY | Facility: CLINIC | Age: 79
End: 2024-07-31

## 2024-10-16 ENCOUNTER — APPOINTMENT (OUTPATIENT)
Dept: PULMONOLOGY | Facility: CLINIC | Age: 79
End: 2024-10-16